# Patient Record
Sex: FEMALE | Race: WHITE | NOT HISPANIC OR LATINO | Employment: FULL TIME | URBAN - METROPOLITAN AREA
[De-identification: names, ages, dates, MRNs, and addresses within clinical notes are randomized per-mention and may not be internally consistent; named-entity substitution may affect disease eponyms.]

---

## 2021-05-26 ENCOUNTER — RECORDS - HEALTHEAST (OUTPATIENT)
Dept: ADMINISTRATIVE | Facility: CLINIC | Age: 51
End: 2021-05-26

## 2021-05-27 ENCOUNTER — RECORDS - HEALTHEAST (OUTPATIENT)
Dept: ADMINISTRATIVE | Facility: CLINIC | Age: 51
End: 2021-05-27

## 2021-05-31 ENCOUNTER — RECORDS - HEALTHEAST (OUTPATIENT)
Dept: ADMINISTRATIVE | Facility: CLINIC | Age: 51
End: 2021-05-31

## 2022-03-04 ENCOUNTER — OFFICE VISIT (OUTPATIENT)
Dept: FAMILY MEDICINE | Facility: CLINIC | Age: 52
End: 2022-03-04
Payer: COMMERCIAL

## 2022-03-04 VITALS
SYSTOLIC BLOOD PRESSURE: 175 MMHG | DIASTOLIC BLOOD PRESSURE: 108 MMHG | RESPIRATION RATE: 16 BRPM | OXYGEN SATURATION: 100 % | HEART RATE: 82 BPM | TEMPERATURE: 97.9 F

## 2022-03-04 DIAGNOSIS — R51.9 ACUTE NONINTRACTABLE HEADACHE, UNSPECIFIED HEADACHE TYPE: ICD-10-CM
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
## 2022-03-04 PROCEDURE — 99204 OFFICE O/P NEW MOD 45 MIN: CPT | Performed by: PHYSICIAN ASSISTANT

## 2022-03-04 NOTE — PROGRESS NOTES
Chief Complaint   Patient presents with     Head Injury     HAPPENED ON 3/1-- FROM A FALL, PERSISTENT HEAD PAIN.        ASSESSMENT/PLAN:  Maggy was seen today for head injury.    Diagnoses and all orders for this visit:    Fall, initial encounter  -     CT Head w/o Contrast; Future  -     CT Head w/o Contrast    Acute nonintractable headache, unspecified headache type  -     CT Head w/o Contrast; Future  -     CT Head w/o Contrast    Given patient's continued focal left-sided headache in a different pattern than her usual headache status post fall CT scan was obtained.  No intracranial abnormality noted.  Given this her symptoms are likely related to tension/whiplash-like injury.  Recommend heat, massage, range of motion, stretching and can use Tylenol and ibuprofen as needed    Tello Jennings PA-C      SUBJECTIVE:  Maggy is a 51 year old female who presents to urgent care with a headache.  She fell 3 days ago on ice and landed on her left knee, shoulder and left side of the face.  She has an abrasion and swelling of the left cheekbone.  The swelling has improved and does not have any significant tenderness there.  Sometime after the fall she developed a headache on the left top/temporal area that has been persistent.  No confusion or memory loss.  No nausea or vomiting.  No paresthesias, muscle weakness or gait abnormalities.  Some slight pain in the shoulder.  Has some chronic neck pain.  She often will get tension headaches but this feels different.  She is not on blood thinners    ROS: Pertinent ROS neg other than the symptoms noted above in the HPI.     OBJECTIVE:  BP (!) 175/108 (BP Location: Right arm, Patient Position: Sitting, Cuff Size: Adult Regular)   Pulse 82   Temp 97.9  F (36.6  C)   Resp 16   SpO2 100%    GENERAL: healthy, alert and no distress  EYES: Eyes grossly normal to inspection, PERRL and conjunctivae and sclerae normal  HENT: ear canals and TM's normal, nose and mouth without  ulcers or lesions, no rhinorrhea, patent oropharynx  NECK: no adenopathy, nontender, full range of motion  SKIN: no suspicious lesions or rashes  NEURO: Normal strength and tone, mentation intact and speech normal, cranial nerves II through XII intact, rapid alternating hand was normal, gait normal, finger-to-nose normal   MSK: No significant ecchymosis or breaks in skin of the scalp    DIAGNOSTICS  CT head w/o contrast:   No results found for any visits on 03/04/22.     No current outpatient medications on file.     No current facility-administered medications for this visit.      Patient Active Problem List   Diagnosis     Cervical Radiculopathy     Joint Pain, Localized In The Shoulder     Hyperlipidemia     Tendonitis      No past medical history on file.  Past Surgical History:   Procedure Laterality Date     [facility] APPENDECTOMY      Description: Appendectomy;  Recorded: 03/27/2008;     No family history on file.  Social History     Tobacco Use     Smoking status: Never Smoker     Smokeless tobacco: Never Used   Substance Use Topics     Alcohol use: Not on file              The plan of care was discussed with the patient. They understand and agree with the course of treatment prescribed. A printed summary was given including instructions and medications.  The use of Dragon/[employer] dictation services may have been used to construct the content in this note; any grammatical or spelling errors are non-intentional. Please contact the author of this note directly if you are in need of any clarification.

## 2022-03-27 ENCOUNTER — HEALTH MAINTENANCE LETTER (OUTPATIENT)
Age: 52
End: 2022-03-27

## 2022-09-25 ENCOUNTER — HEALTH MAINTENANCE LETTER (OUTPATIENT)
Age: 52
End: 2022-09-25

## 2023-05-13 ENCOUNTER — HEALTH MAINTENANCE LETTER (OUTPATIENT)
Age: 53
End: 2023-05-13

## 2024-01-09 ENCOUNTER — APPOINTMENT (OUTPATIENT)
Dept: CT IMAGING | Facility: HOSPITAL | Age: 54
End: 2024-01-09
Attending: EMERGENCY MEDICINE
Payer: COMMERCIAL

## 2024-01-09 ENCOUNTER — APPOINTMENT (OUTPATIENT)
Dept: RADIOLOGY | Facility: HOSPITAL | Age: 54
End: 2024-01-09
Attending: EMERGENCY MEDICINE
Payer: COMMERCIAL

## 2024-01-09 ENCOUNTER — DOCUMENTATION ONLY (OUTPATIENT)
Dept: OTHER | Facility: CLINIC | Age: 54
End: 2024-01-09

## 2024-01-09 ENCOUNTER — HOSPITAL ENCOUNTER (EMERGENCY)
Facility: HOSPITAL | Age: 54
Discharge: HOME | End: 2024-01-09
Attending: EMERGENCY MEDICINE | Admitting: EMERGENCY MEDICINE
Payer: COMMERCIAL

## 2024-01-09 ENCOUNTER — APPOINTMENT (OUTPATIENT)
Dept: ULTRASOUND IMAGING | Facility: HOSPITAL | Age: 54
End: 2024-01-09
Attending: EMERGENCY MEDICINE
Payer: COMMERCIAL

## 2024-01-09 ENCOUNTER — NURSE TRIAGE (OUTPATIENT)
Dept: NURSING | Facility: CLINIC | Age: 54
End: 2024-01-09
Payer: COMMERCIAL

## 2024-01-09 VITALS
TEMPERATURE: 99.5 F | HEART RATE: 104 BPM | RESPIRATION RATE: 20 BRPM | BODY MASS INDEX: 28.63 KG/M2 | HEIGHT: 70 IN | DIASTOLIC BLOOD PRESSURE: 88 MMHG | OXYGEN SATURATION: 93 % | WEIGHT: 200 LBS | SYSTOLIC BLOOD PRESSURE: 157 MMHG

## 2024-01-09 DIAGNOSIS — R10.13 EPIGASTRIC PAIN: ICD-10-CM

## 2024-01-09 DIAGNOSIS — R11.2 NAUSEA AND VOMITING, UNSPECIFIED VOMITING TYPE: ICD-10-CM

## 2024-01-09 LAB
ALBUMIN SERPL BCG-MCNC: 4.9 G/DL (ref 3.5–5.2)
ALBUMIN UR-MCNC: 70 MG/DL
ALP SERPL-CCNC: 81 U/L (ref 40–150)
ALT SERPL W P-5'-P-CCNC: 130 U/L (ref 0–50)
ANION GAP SERPL CALCULATED.3IONS-SCNC: 14 MMOL/L (ref 7–15)
APPEARANCE UR: ABNORMAL
AST SERPL W P-5'-P-CCNC: 78 U/L (ref 0–45)
BASOPHILS # BLD AUTO: 0 10E3/UL (ref 0–0.2)
BASOPHILS NFR BLD AUTO: 0 %
BILIRUB DIRECT SERPL-MCNC: 0.25 MG/DL (ref 0–0.3)
BILIRUB SERPL-MCNC: 1.4 MG/DL
BILIRUB UR QL STRIP: NEGATIVE
BUN SERPL-MCNC: 20.8 MG/DL (ref 6–20)
CALCIUM SERPL-MCNC: 11.4 MG/DL (ref 8.6–10)
CHLORIDE SERPL-SCNC: 100 MMOL/L (ref 98–107)
COLOR UR AUTO: YELLOW
CREAT SERPL-MCNC: 0.94 MG/DL (ref 0.51–0.95)
DEPRECATED HCO3 PLAS-SCNC: 27 MMOL/L (ref 22–29)
EGFRCR SERPLBLD CKD-EPI 2021: 72 ML/MIN/1.73M2
EOSINOPHIL # BLD AUTO: 0 10E3/UL (ref 0–0.7)
EOSINOPHIL NFR BLD AUTO: 0 %
ERYTHROCYTE [DISTWIDTH] IN BLOOD BY AUTOMATED COUNT: 12.6 % (ref 10–15)
GLUCOSE SERPL-MCNC: 141 MG/DL (ref 70–99)
GLUCOSE UR STRIP-MCNC: NEGATIVE MG/DL
HCT VFR BLD AUTO: 46.3 % (ref 35–47)
HGB BLD-MCNC: 16.2 G/DL (ref 11.7–15.7)
HGB UR QL STRIP: NEGATIVE
HOLD SPECIMEN: NORMAL
HYALINE CASTS: 2 /LPF
IMM GRANULOCYTES # BLD: 0 10E3/UL
IMM GRANULOCYTES NFR BLD: 0 %
KETONES UR STRIP-MCNC: 20 MG/DL
LEUKOCYTE ESTERASE UR QL STRIP: NEGATIVE
LIPASE SERPL-CCNC: 37 U/L (ref 13–60)
LYMPHOCYTES # BLD AUTO: 1.2 10E3/UL (ref 0.8–5.3)
LYMPHOCYTES NFR BLD AUTO: 11 %
MCH RBC QN AUTO: 34.4 PG (ref 26.5–33)
MCHC RBC AUTO-ENTMCNC: 35 G/DL (ref 31.5–36.5)
MCV RBC AUTO: 98 FL (ref 78–100)
MONOCYTES # BLD AUTO: 0.6 10E3/UL (ref 0–1.3)
MONOCYTES NFR BLD AUTO: 6 %
MUCOUS THREADS #/AREA URNS LPF: PRESENT /LPF
NEUTROPHILS # BLD AUTO: 8.9 10E3/UL (ref 1.6–8.3)
NEUTROPHILS NFR BLD AUTO: 83 %
NITRATE UR QL: NEGATIVE
NRBC # BLD AUTO: 0 10E3/UL
NRBC BLD AUTO-RTO: 0 /100
PH UR STRIP: 5.5 [PH] (ref 5–7)
PLATELET # BLD AUTO: 308 10E3/UL (ref 150–450)
POTASSIUM SERPL-SCNC: 4.7 MMOL/L (ref 3.4–5.3)
PROT SERPL-MCNC: 7.4 G/DL (ref 6.4–8.3)
RBC # BLD AUTO: 4.71 10E6/UL (ref 3.8–5.2)
RBC URINE: 0 /HPF
SODIUM SERPL-SCNC: 141 MMOL/L (ref 135–145)
SP GR UR STRIP: 1.02 (ref 1–1.03)
SQUAMOUS EPITHELIAL: 29 /HPF
TROPONIN T SERPL HS-MCNC: 14 NG/L
TROPONIN T SERPL HS-MCNC: 19 NG/L
UROBILINOGEN UR STRIP-MCNC: <2 MG/DL
WBC # BLD AUTO: 10.8 10E3/UL (ref 4–11)
WBC URINE: 0 /HPF

## 2024-01-09 PROCEDURE — 85025 COMPLETE CBC W/AUTO DIFF WBC: CPT | Performed by: STUDENT IN AN ORGANIZED HEALTH CARE EDUCATION/TRAINING PROGRAM

## 2024-01-09 PROCEDURE — 81001 URINALYSIS AUTO W/SCOPE: CPT | Performed by: STUDENT IN AN ORGANIZED HEALTH CARE EDUCATION/TRAINING PROGRAM

## 2024-01-09 PROCEDURE — 76705 ECHO EXAM OF ABDOMEN: CPT

## 2024-01-09 PROCEDURE — 93005 ELECTROCARDIOGRAM TRACING: CPT | Performed by: STUDENT IN AN ORGANIZED HEALTH CARE EDUCATION/TRAINING PROGRAM

## 2024-01-09 PROCEDURE — 82248 BILIRUBIN DIRECT: CPT | Performed by: STUDENT IN AN ORGANIZED HEALTH CARE EDUCATION/TRAINING PROGRAM

## 2024-01-09 PROCEDURE — 36415 COLL VENOUS BLD VENIPUNCTURE: CPT | Performed by: EMERGENCY MEDICINE

## 2024-01-09 PROCEDURE — 250N000011 HC RX IP 250 OP 636: Performed by: STUDENT IN AN ORGANIZED HEALTH CARE EDUCATION/TRAINING PROGRAM

## 2024-01-09 PROCEDURE — 36415 COLL VENOUS BLD VENIPUNCTURE: CPT | Performed by: STUDENT IN AN ORGANIZED HEALTH CARE EDUCATION/TRAINING PROGRAM

## 2024-01-09 PROCEDURE — 83690 ASSAY OF LIPASE: CPT | Performed by: STUDENT IN AN ORGANIZED HEALTH CARE EDUCATION/TRAINING PROGRAM

## 2024-01-09 PROCEDURE — 74177 CT ABD & PELVIS W/CONTRAST: CPT

## 2024-01-09 PROCEDURE — 84484 ASSAY OF TROPONIN QUANT: CPT | Performed by: EMERGENCY MEDICINE

## 2024-01-09 PROCEDURE — 96374 THER/PROPH/DIAG INJ IV PUSH: CPT | Mod: 59

## 2024-01-09 PROCEDURE — 84155 ASSAY OF PROTEIN SERUM: CPT | Performed by: STUDENT IN AN ORGANIZED HEALTH CARE EDUCATION/TRAINING PROGRAM

## 2024-01-09 PROCEDURE — 99285 EMERGENCY DEPT VISIT HI MDM: CPT | Mod: 25

## 2024-01-09 PROCEDURE — 250N000011 HC RX IP 250 OP 636: Performed by: EMERGENCY MEDICINE

## 2024-01-09 PROCEDURE — 71045 X-RAY EXAM CHEST 1 VIEW: CPT

## 2024-01-09 RX ORDER — FAMOTIDINE 20 MG/1
20 TABLET, FILM COATED ORAL 2 TIMES DAILY
Qty: 30 TABLET | Refills: 0 | Status: SHIPPED | OUTPATIENT
Start: 2024-01-09

## 2024-01-09 RX ORDER — ONDANSETRON 2 MG/ML
4 INJECTION INTRAMUSCULAR; INTRAVENOUS
Status: COMPLETED | OUTPATIENT
Start: 2024-01-09 | End: 2024-01-09

## 2024-01-09 RX ORDER — IOPAMIDOL 755 MG/ML
90 INJECTION, SOLUTION INTRAVASCULAR ONCE
Status: COMPLETED | OUTPATIENT
Start: 2024-01-09 | End: 2024-01-09

## 2024-01-09 RX ORDER — ONDANSETRON 4 MG/1
4 TABLET, ORALLY DISINTEGRATING ORAL EVERY 8 HOURS PRN
Qty: 12 TABLET | Refills: 0 | Status: SHIPPED | OUTPATIENT
Start: 2024-01-09 | End: 2024-01-13

## 2024-01-09 RX ADMIN — IOPAMIDOL 90 ML: 755 INJECTION, SOLUTION INTRAVENOUS at 12:35

## 2024-01-09 RX ADMIN — ONDANSETRON 4 MG: 2 INJECTION INTRAMUSCULAR; INTRAVENOUS at 09:33

## 2024-01-09 NOTE — TELEPHONE ENCOUNTER
Nurse Triage SBAR    Is this a 2nd Level Triage? NO    Situation: Vomiting with nausea and heartburn      Assessment: Woke up this morning at 1:30 with severe heartburn and then woke up at 3:30 with nausea and vomiting, states she has no chest pain, did vomit 6 times and it was pink but she states it was not blood/ No abdominal pain. Also feels constipated, last BM was three days ago.    Protocol Recommended Disposition:   See in Office Today, See More Appropriate Protocol Care advise given and will go to  in [place].    Radha Hook RN on 1/9/2024 at 7:26 AM        Reason for Disposition   Vomiting occurs   MILD to MODERATE vomiting (e.g., 1-5 times/day) and lasts > 48 hours (2 days)    Additional Information   Negative: Shock suspected (e.g., cold/pale/clammy skin, too weak to stand, low BP, rapid pulse)   Negative: Difficult to awaken or acting confused (e.g., disoriented, slurred speech)   Negative: Sounds like a life-threatening emergency to the triager   Negative: Vomiting occurs only while coughing   Negative: Pregnant < 20 Weeks and nausea/vomiting began in early pregnancy (i.e., 4-8 weeks pregnant)   Negative: Chest pain   Negative: Headache is main symptom   Negative: Vomiting red blood or black (coffee ground) material   Negative: Vomiting and hernia is more painful or swollen than usual   Negative: Recent head injury (within 3 days)   Negative: Recent abdominal injury (within 7 days)   Negative: Insulin-dependent diabetes and glucose > 240 mg/dL (13 mmol/L)   Negative: Severe pain in one eye   Negative: Shock suspected (e.g., cold/pale/clammy skin, too weak to stand, low BP, rapid pulse)   Negative: Sounds like a life-threatening emergency to the triager   Negative: Nausea or vomiting and pregnancy < 20 weeks   Negative: Menstrual Period - Missed or Late (i.e., pregnancy suspected)   Negative: Heat exhaustion suspected (i.e., dehydration from heat exposure)   Negative: Anxiety or stress suspected  (i.e., nausea with anxiety attacks or stressful situations)   Negative: Traumatic Brain Injury (TBI) suspected    Protocols used: Nausea-A-OH, Vomiting-A-OH

## 2024-01-09 NOTE — ED TRIAGE NOTES
Patient presents here with nausea, vomiting and abdominal pain that began at about 1 am. The pain that she has is described as burning. She locates the pain to the upper midline abdomen with periods of burning up her esophagus. Eating or drinking anything stimulates severe vomiting.

## 2024-01-11 ENCOUNTER — OFFICE VISIT (OUTPATIENT)
Dept: FAMILY MEDICINE | Facility: CLINIC | Age: 54
End: 2024-01-11
Payer: COMMERCIAL

## 2024-01-11 VITALS
HEIGHT: 68 IN | HEART RATE: 90 BPM | WEIGHT: 196.6 LBS | BODY MASS INDEX: 29.8 KG/M2 | TEMPERATURE: 99.5 F | RESPIRATION RATE: 12 BRPM | OXYGEN SATURATION: 97 % | SYSTOLIC BLOOD PRESSURE: 153 MMHG | DIASTOLIC BLOOD PRESSURE: 104 MMHG

## 2024-01-11 DIAGNOSIS — K76.0 FATTY LIVER: ICD-10-CM

## 2024-01-11 DIAGNOSIS — R10.13 EPIGASTRIC PAIN: ICD-10-CM

## 2024-01-11 DIAGNOSIS — R79.89 ELEVATED LIVER FUNCTION TESTS: ICD-10-CM

## 2024-01-11 DIAGNOSIS — E83.52 HYPERCALCEMIA: ICD-10-CM

## 2024-01-11 PROCEDURE — 80053 COMPREHEN METABOLIC PANEL: CPT | Performed by: FAMILY MEDICINE

## 2024-01-11 PROCEDURE — 80074 ACUTE HEPATITIS PANEL: CPT | Performed by: FAMILY MEDICINE

## 2024-01-11 PROCEDURE — 36415 COLL VENOUS BLD VENIPUNCTURE: CPT | Performed by: FAMILY MEDICINE

## 2024-01-11 PROCEDURE — 99214 OFFICE O/P EST MOD 30 MIN: CPT | Performed by: FAMILY MEDICINE

## 2024-01-11 NOTE — PATIENT INSTRUCTIONS
Recheck calcium and liver enzymes (CMP)    Check viral Hepatitis screen    Follow up with the Hepatologist and gastroenterology    No alcohol!    TAKE PEPCID 20 MG TWICE DAILY

## 2024-01-11 NOTE — LETTER
January 14, 2024      Maggy M Jaclyn  [address]   [place] [address]        Dear   We are writing to inform you of your test results.    Holger Winter:  Your calcium is now normal.  The screening for viral hepatitis is NEGATIVE.  Two liver enzymes have slightly improved, though are still elevated.  The total bilirubin went up slightly.  The remaining labs are normal.  Follow up with the GI and Liver specialists as scheduled.    Resulted Orders   Comprehensive metabolic panel (BMP + Alb, Alk Phos, ALT, AST, Total. Bili, TP)   Result Value Ref Range    Sodium 136 135 - 145 mmol/L      Comment:      Reference intervals for this test were updated on 09/26/2023 to more accurately reflect our healthy population. There may be differences in the flagging of prior results with similar values performed with this method. Interpretation of those prior results can be made in the context of the updated reference intervals.     Potassium 4.3 3.4 - 5.3 mmol/L    Carbon Dioxide (CO2) 23 22 - 29 mmol/L    Anion Gap 16 (H) 7 - 15 mmol/L    Urea Nitrogen 17.6 6.0 - 20.0 mg/dL    Creatinine 0.96 (H) 0.51 - 0.95 mg/dL    GFR Estimate 70 >60 mL/min/1.73m2    Calcium 9.9 8.6 - 10.0 mg/dL    Chloride 97 (L) 98 - 107 mmol/L    Glucose 100 (H) 70 - 99 mg/dL    Alkaline Phosphatase 68 40 - 150 U/L      Comment:      Reference intervals for this test were updated on 11/14/2023 to more accurately reflect our healthy population. There may be differences in the flagging of prior results with similar values performed with this method. Interpretation of those prior results can be made in the context of the updated reference intervals.    AST 69 (H) 0 - 45 U/L      Comment:      Reference intervals for this test were updated on 6/12/2023 to more accurately reflect our healthy population. There may be differences in the flagging of prior results with similar values performed with this method. Interpretation of those prior  results can be made in the context of the updated reference intervals.     (H) 0 - 50 U/L      Comment:      Reference intervals for this test were updated on 6/12/2023 to more accurately reflect our healthy population. There may be differences in the flagging of prior results with similar values performed with this method. Interpretation of those prior results can be made in the context of the updated reference intervals.      Protein Total 7.0 6.4 - 8.3 g/dL    Albumin 4.4 3.5 - 5.2 g/dL    Bilirubin Total 1.8 (H) <=1.2 mg/dL   Acute hepatitis panel   Result Value Ref Range    Hepatitis A Antibody IgM Nonreactive Nonreactive      Comment:      Nonreactive results indicate either inadequate or delayed anti-HAV IgM response after known exposure to HAV or absence of acute or recent hepatitis A.    Hepatitis B Core Antibody IgM Nonreactive Nonreactive      Comment:      A nonreactive result suggests lack of recent exposure to the virus in the preceding 6 months.    Hepatitis C Antibody Nonreactive Nonreactive      Comment:      A nonreactive screening test result does not exclude the possibility of exposure to or infection with HCV. Nonreactive screening test results in individuals with prior exposure to HCV may be due to antibody levels below the limit of detection of this assay or lack of reactivity to the HCV antigens used in this assay. Patients with recent HCV infections (<3 months from time of exposure) may have false-negative HCV antibody results due to the time needed for seroconversion (average of 8 to 9 weeks).    Hepatitis B Surface Antigen Nonreactive Nonreactive       If you have any questions or concerns, please call the clinic at the number listed above.       Sincerely,      Sam Hunter MD

## 2024-01-11 NOTE — PROGRESS NOTES
"      Yusuf Winter is a 53 year old, presenting for the following health issues:  Hospital F/U (Follow up hospital for extreme nausea and heartburn.  Some constipation.  Feeling good now.  Has been taking pepcid and one dose of zofran. )        1/11/2024     2:58 PM   Additional Questions   Roomed by PINO Gonzalez CMA([facility])       HPI     Two days ago in the ER had pressure and fullness in the upper abdomen (had heart burn following a hamburger before going to bed), awoke at 1:20 am with extreme heartburn.  Took antacid without help.  Explosive vomiting at about 3:30am of water. (Pink color but had had gas-ex).  Called nurse line at 4-5am and then went to [facility] ER.  Drinks about 3 cocktails a week and nothing that evening.  No chest pain with the walking she does at her job.  Takes Advil 2 at a time a couple times a week.    Since leaving the hospital.  Feeling better.  Has been taking pepcid rxed at the hospital.          Hospital Follow-up Visit:    Hospital/Nursing Home/IP Rehab Facility: [facility]  Date of Admission: 01/09/2024  Date of Discharge: 01/09/2024  Reason(s) for Admission: FINAL IMPRESSION:  1. Nausea and vomiting, unspecified vomiting type    2. Epigastric pain         Was your hospitalization related to COVID-19? No   Problems taking medications regularly:  None  Medication changes since discharge: None  Problems adhering to non-medication therapy:  None    Summary of hospitalization:  [facility] discharge summary reviewed  Diagnostic Tests/Treatments reviewed.  Follow up needed: repeat liver enzymes and calcium  Other Healthcare Providers Involved in Patient s Care:         None  Update since discharge: improved.         Plan of care communicated with patient           Review of Systems        Objective    BP (!) 153/104   Pulse 90   Temp 99.5  F (37.5  C) (Oral)   Resp 12   Ht 1.734 m (5' 8.25\")   Wt 89.2 kg (196 lb 9.6 oz)   SpO2 97%   " BMI 29.67 kg/m    Body mass index is 29.67 kg/m .    Wt Readings from Last 4 Encounters:   01/11/24 89.2 kg (196 lb 9.6 oz)   01/09/24 90.7 kg (200 lb)      BP Readings from Last 6 Encounters:   01/11/24 (!) 153/104   01/09/24 (!) 157/88   03/04/22 (!) 175/108      Physical Exam   GENERAL: healthy, alert and no distress  RESP: lungs clear to auscultation - no rales, rhonchi or wheezes  CV: regular rate and rhythm, normal S1 S2, no S3 or S4, no murmur, click or rub, no peripheral edema and peripheral pulses strong  ABDOMEN: soft, nontender, no hepatosplenomegaly, no masses and bowel sounds normal  MS: no gross musculoskeletal defects noted, no edema      EXAM: US ABDOMEN LIMITED  LOCATION: [facility]  DATE: 1/9/2024     INDICATION: Upper abdominal pain  COMPARISON: None.  TECHNIQUE: Limited abdominal ultrasound.     FINDINGS:     GALLBLADDER: Normal. No gallstones, wall thickening, or pericholecystic fluid. Negative sonographic Chawla's sign.     BILE DUCTS: No biliary dilatation. The common duct measures 5 mm.     LIVER: Mildly enlarged with a diffusely coarsened echotexture. Normal surface contour. Antegrade flow in the normal caliber main portal vein. No focal mass.     RIGHT KIDNEY: No hydronephrosis.     PANCREAS: The visualized portions are normal.     No ascites.                                                                      IMPRESSION:  1.  Mildly enlarged fatty liver.  2.  Normal gallbladder and bile ducts. No cholelithiasis and no evidence of acute cholecystitis.         EXAM: CT ABDOMEN PELVIS W CONTRAST  LOCATION: [facility]  DATE: 1/9/2024     INDICATION: Mid abdominal pain  COMPARISON: Ultrasound abdomen dated same day  TECHNIQUE: CT scan of the abdomen and pelvis was performed following injection of IV contrast. Multiplanar reformats were obtained. Dose reduction techniques were used.  CONTRAST: IsoVue 370 90mL     FINDINGS:      LOWER CHEST:  Scattered airway centric groundglass within both lung bases, possibly aspiration. Coronary calcifications.     HEPATOBILIARY: Hepatic steatosis. No biliary dilation. Normal gallbladder.     PANCREAS: Normal.     SPLEEN: Normal.     ADRENAL GLANDS: Normal.     KIDNEYS/BLADDER: Normal kidneys. Contrast is seen within the bilateral renal collecting systems. No hydronephrosis. Underdistended bladder, limiting evaluation.      BOWEL: Small hiatal hernia. No obstruction. Appendix is not visualized. No secondary findings of acute appendicitis in the right lower quadrant. Colonic diverticulosis. No pneumoperitoneum or free fluid.      LYMPH NODES: No pathologically enlarged lymph nodes.     VASCULATURE: Nonaneurysmal aorta with minimal aortoiliac calcifications.      PELVIC ORGANS: 2.1 cm right adnexal cyst.     MUSCULOSKELETAL: No acute osseous abnormalities.                                                                      IMPRESSION:  1.  No acute abnormalities in the abdomen or pelvis.  2.  Marked hepatic steatosis, which can cause cirrhosis. Consider nonemergent/outpatient hepatology consultation.  3.  Bibasilar airway centric groundglass, possibly sequela of aspiration.  4.  Chronic and ancillary findings as described.      EXAM: XR CHEST 1 VIEW  LOCATION: [facility]  DATE: 1/9/2024     INDICATION: Chest and epigastric pain  COMPARISON: None.                                                                      IMPRESSION: Negative chest.  The lungs are clear and there are no pleural effusions. Normal heart size.    CBC RESULTS:   Recent Labs   Lab Test 01/09/24  0925   WBC 10.8   RBC 4.71   HGB 16.2*   HCT 46.3   MCV 98   MCH 34.4*   MCHC 35.0   RDW 12.6         Last Comprehensive Metabolic Panel:  Sodium   Date Value Ref Range Status   01/09/2024 141 135 - 145 mmol/L Final     Comment:     Reference intervals for this test were updated on 09/26/2023 to more accurately reflect our  healthy population. There may be differences in the flagging of prior results with similar values performed with this method. Interpretation of those prior results can be made in the context of the updated reference intervals.      Potassium   Date Value Ref Range Status   01/09/2024 4.7 3.4 - 5.3 mmol/L Final     Chloride   Date Value Ref Range Status   01/09/2024 100 98 - 107 mmol/L Final     Carbon Dioxide (CO2)   Date Value Ref Range Status   01/09/2024 27 22 - 29 mmol/L Final     Anion Gap   Date Value Ref Range Status   01/09/2024 14 7 - 15 mmol/L Final     Glucose   Date Value Ref Range Status   01/09/2024 141 (H) 70 - 99 mg/dL Final     Urea Nitrogen   Date Value Ref Range Status   01/09/2024 20.8 (H) 6.0 - 20.0 mg/dL Final     Creatinine   Date Value Ref Range Status   01/09/2024 0.94 0.51 - 0.95 mg/dL Final     GFR Estimate   Date Value Ref Range Status   01/09/2024 72 >60 mL/min/1.73m2 Final     Calcium   Date Value Ref Range Status   01/09/2024 11.4 (H) 8.6 - 10.0 mg/dL Final     Bilirubin Total   Date Value Ref Range Status   01/09/2024 1.4 (H) <=1.2 mg/dL Final     Alkaline Phosphatase   Date Value Ref Range Status   01/09/2024 81 40 - 150 U/L Final     Comment:     Reference intervals for this test were updated on 11/14/2023 to more accurately reflect our healthy population. There may be differences in the flagging of prior results with similar values performed with this method. Interpretation of those prior results can be made in the context of the updated reference intervals.     ALT   Date Value Ref Range Status   01/09/2024 130 (H) 0 - 50 U/L Final     Comment:     Reference intervals for this test were updated on 6/12/2023 to more accurately reflect our healthy population. There may be differences in the flagging of prior results with similar values performed with this method. Interpretation of those prior results can be made in the context of the updated reference intervals.       AST   Date  Value Ref Range Status   01/09/2024 78 (H) 0 - 45 U/L Final     Comment:     Reference intervals for this test were updated on 6/12/2023 to more accurately reflect our healthy population. There may be differences in the flagging of prior results with similar values performed with this method. Interpretation of those prior results can be made in the context of the updated reference intervals.       Lipase   Date Value Ref Range Status   01/09/2024 37 13 - 60 U/L Final             Encounter Diagnoses   Name Primary?    Epigastric pain, (fullness), resolved     Hypercalcemia     Elevated liver function tests     Fatty liver       PLAN:        Recheck calcium and liver enzymes (CMP)    Check viral Hepatitis screen    Follow up with the Hepatologist and gastroenterology    No alcohol!    TAKE PEPCID 20 MG TWICE DAILY     34 min spent in review of ER visit and plans going forward.

## 2024-01-11 NOTE — COMMUNITY RESOURCES LIST (ENGLISH)
01/11/2024   [facility]  N/A  For questions about this resource list or additional care needs, please contact your primary care clinic or care manager.  Phone: [phone]   Email: N/A   Address: [facility] [place] [address]   Hours: N/A        Transportation       Free or low-cost transportation  1  [facility] Distance: 1.31 miles      In-Person   [address] [place] [address]  Language: English  Hours: Mon - Wed 9:30 AM - 12:00 PM , Mon - Wed 1:00 PM - 2:00 PM Appt. Only  Fees: Free   Phone: (587) 343-1881 Email: info@[facility].org Website: http://www.[facility].org/     2  [facility] Distance: 1.45 miles      In-Person   [address] [place] [address]  Language: English  Hours: Mon - Fri 8:00 AM - 3:00 PM  Fees: Free   Phone: (826) [phone] Ext.14 Email: [email or web address] Website: [email or web address]     Transportation to medical appointments  3  [facility] Medical Transportation - Non-Emergency Medical Transportation Distance: 7.01 miles      In-Person   [address] [place] [address]  Language: English  Hours: Mon - Fri 8:00 AM - 4:00 PM Appt. Only  Fees: Self Pay   Phone: (192) 738-4997 Website: [email or web address]-Services/[email or web address]-medical-services/Non-emergency-transportation/     4  [facility] Transportation Programs - Non-Emergency Medical Transportation Distance: 7.5 miles      In-Person   [address] [place] [address]  Language: English, Cambodian, Portuguese  Hours: Mon - Fri 7:00 AM - 6:00 PM  Fees: Insurance   Phone: [phone]) [phone] Ext.4425 Email: [email or web address] Website: [email or web address]          Important Numbers & Websites       Emergency Services   911  City Services   311  Poison Control   (856) 792-4605  Suicide Prevention Lifeline   (199) 548-3047 (TALK)  Child Abuse Hotline   (592) 400-1622 (4-A-Child)  Sexual  Assault Hotline   (807) 936-8462 (HOPE)  National Runaway Safeline   (839) 988-3686 (RUNAWAY)  All-Options Talkline   (420) 970-3022  Substance Abuse Referral   (607) 249-5477 (HELP)

## 2024-01-11 NOTE — CONFIDENTIAL NOTE
DIAGNOSIS:   Epigastric pain    Appt Date: 03.08.2024    NOTES STATUS DETAILS   OFFICE NOTE from referring provider Internal 01.09.2024  Len Jeffers DO    OFFICE NOTES from other specialists     DISCHARGE SUMMARY from hospital Internal 01.09.2024 Len Jeffers DO    MEDICATION LIST Internal    LIVER BIOSPY (IF APPLICABLE)      PATHOLOGY REPORTS      IMAGING     ENDOSCOPY (IF AVAILABLE)     COLONOSCOPY (IF AVAILABLE)     ULTRASOUND LIVER Internal 01.09.2024 US ABDOMEN LIMITED    CT OF ABDOMEN Internal 01.09.2024 CT ABDOMEN PELVIS W CONTRAST    MRI OF LIVER     FIBROSCAN, US ELASTOGRAPHY, FIBROSIS SCAN, MR ELASTOGRAPHY     LABS     HEPATIC PANEL (LIVER PANEL)     BASIC METABOLIC PANEL     COMPLETE METABOLIC PANEL Internal 01.09.2024   COMPLETE BLOOD COUNT (CBC) Internal 01.09.2024   INTERNATIONAL NORMALIZED RATIO (INR)     HEPATITIS C ANTIBODY     HEPATITIS C VIRAL LOAD/PCR     HEPATITIS C GENOTYPE     HEPATITIS B SURFACE ANTIGEN     HEPATITIS B SURFACE ANTIBODY     HEPATITIS B DNA QUANT LEVEL     HEPATITIS B CORE ANTIBODY

## 2024-01-12 LAB
ALBUMIN SERPL BCG-MCNC: 4.4 G/DL (ref 3.5–5.2)
ALP SERPL-CCNC: 68 U/L (ref 40–150)
ALT SERPL W P-5'-P-CCNC: 108 U/L (ref 0–50)
ANION GAP SERPL CALCULATED.3IONS-SCNC: 16 MMOL/L (ref 7–15)
AST SERPL W P-5'-P-CCNC: 69 U/L (ref 0–45)
ATRIAL RATE - MUSE: 109 BPM
BILIRUB SERPL-MCNC: 1.8 MG/DL
BUN SERPL-MCNC: 17.6 MG/DL (ref 6–20)
CALCIUM SERPL-MCNC: 9.9 MG/DL (ref 8.6–10)
CHLORIDE SERPL-SCNC: 97 MMOL/L (ref 98–107)
CREAT SERPL-MCNC: 0.96 MG/DL (ref 0.51–0.95)
DEPRECATED HCO3 PLAS-SCNC: 23 MMOL/L (ref 22–29)
DIASTOLIC BLOOD PRESSURE - MUSE: NORMAL MMHG
EGFRCR SERPLBLD CKD-EPI 2021: 70 ML/MIN/1.73M2
GLUCOSE SERPL-MCNC: 100 MG/DL (ref 70–99)
HAV IGM SERPL QL IA: NONREACTIVE
HBV CORE IGM SERPL QL IA: NONREACTIVE
HBV SURFACE AG SERPL QL IA: NONREACTIVE
HCV AB SERPL QL IA: NONREACTIVE
INTERPRETATION ECG - MUSE: NORMAL
P AXIS - MUSE: 42 DEGREES
POTASSIUM SERPL-SCNC: 4.3 MMOL/L (ref 3.4–5.3)
PR INTERVAL - MUSE: 158 MS
PROT SERPL-MCNC: 7 G/DL (ref 6.4–8.3)
QRS DURATION - MUSE: 72 MS
QT - MUSE: 346 MS
QTC - MUSE: 465 MS
R AXIS - MUSE: 37 DEGREES
SODIUM SERPL-SCNC: 136 MMOL/L (ref 135–145)
SYSTOLIC BLOOD PRESSURE - MUSE: NORMAL MMHG
T AXIS - MUSE: 49 DEGREES
VENTRICULAR RATE- MUSE: 109 BPM

## 2024-02-08 NOTE — TELEPHONE ENCOUNTER
REFERRAL INFORMATION:  Referring Provider:  Len Jeffers DO   Referring Clinic:    [facility]     Reason for Visit/Diagnosis: Epigastric pain      FUTURE VISIT INFORMATION:  Appointment Date: 2/15/2024   Appointment Time: 10:45AM      NOTES STATUS DETAILS   OFFICE NOTE from Referring Provider Internal Len Jeffers DO    OFFICE NOTE from Other Specialist Internal In [facility] DISCHARGE SUMMARY/  ED VISITS Internal 1/9/2024 ED- Abdominal Pain   MEDICATION LIST Internal         PERTINENT LABS Internal    IMAGING (CT, MRI, EGD, MRCP, Small Bowel Follow Through/SBT, MR/CT Enterography) Internal CT Abdomen Pelvis 1/9/2024   US abdomen Limited 1/9/2024

## 2024-02-15 ENCOUNTER — PRE VISIT (OUTPATIENT)
Dept: GASTROENTEROLOGY | Facility: CLINIC | Age: 54
End: 2024-02-15

## 2024-03-01 DIAGNOSIS — K76.0 FATTY LIVER: Primary | ICD-10-CM

## 2024-03-08 ENCOUNTER — PRE VISIT (OUTPATIENT)
Dept: GASTROENTEROLOGY | Facility: CLINIC | Age: 54
End: 2024-03-08

## 2024-07-20 ENCOUNTER — HEALTH MAINTENANCE LETTER (OUTPATIENT)
Age: 54
End: 2024-07-20

## 2025-03-08 ENCOUNTER — OFFICE VISIT (OUTPATIENT)
Dept: URGENT CARE | Facility: URGENT CARE | Age: 55
End: 2025-03-08
Payer: COMMERCIAL

## 2025-03-08 VITALS
TEMPERATURE: 98.6 F | OXYGEN SATURATION: 98 % | WEIGHT: 200 LBS | SYSTOLIC BLOOD PRESSURE: 182 MMHG | RESPIRATION RATE: 24 BRPM | BODY MASS INDEX: 30.19 KG/M2 | HEART RATE: 85 BPM | DIASTOLIC BLOOD PRESSURE: 121 MMHG

## 2025-03-08 DIAGNOSIS — S05.02XA ABRASION OF LEFT CORNEA, INITIAL ENCOUNTER: Primary | ICD-10-CM

## 2025-03-08 PROCEDURE — 3080F DIAST BP >= 90 MM HG: CPT

## 2025-03-08 PROCEDURE — 3077F SYST BP >= 140 MM HG: CPT

## 2025-03-08 PROCEDURE — 99213 OFFICE O/P EST LOW 20 MIN: CPT

## 2025-03-08 RX ORDER — GENTAMICIN SULFATE 3 MG/ML
1-2 SOLUTION/ DROPS OPHTHALMIC EVERY 4 HOURS
Qty: 15 ML | Refills: 0 | Status: SHIPPED | OUTPATIENT
Start: 2025-03-08 | End: 2025-03-13

## 2025-03-08 RX ORDER — KETOROLAC TROMETHAMINE 5 MG/ML
1 SOLUTION OPHTHALMIC EVERY 6 HOURS PRN
Qty: 5 ML | Refills: 0 | Status: SHIPPED | OUTPATIENT
Start: 2025-03-08

## 2025-03-08 ASSESSMENT — ENCOUNTER SYMPTOMS: EYE PAIN: 1

## 2025-03-08 ASSESSMENT — VISUAL ACUITY: OU: 1

## 2025-03-08 NOTE — PROGRESS NOTES
Assessment & Plan     Abrasion of left cornea, initial encounter  - gentamicin (GARAMYCIN) 0.3 % ophthalmic solution  Dispense: 15 mL; Refill: 0  - ketorolac (ACULAR) 0.5 % ophthalmic solution  Dispense: 5 mL; Refill: 0    Recommended follow-up with eye clinic if symptoms not improving.  Discussed signs and symptoms that would prompt immediate return to medical attention.             No follow-ups on file.    [facility]  PROVIDER  [facility]    Subjective     Maggy is a 54 year old female who presents to clinic today for the following health issues:  Chief Complaint   Patient presents with    Eye Problem     Bilateral eye redness. HX of chronic dry eye. New symptoms started over the last couple of days. Painful and getting worse.          3/8/2025     4:39 PM   Additional Questions   Roomed by Elizabeth ISABEL   Accompanied by self     Eye Problem         Maggy is a 54-year-old with chronic dry eyes on lubrication treatment who presents with a left painful reddened eye.  She states that both of her eyes are frequently bloodshot, but the left eye has significant pain associated with it on the lateral side.  She has a sense that she probably did rub and/or scratch this area but cannot recall the specific incident.  No concern for foreign body.  No change in her vision.  No purulent drainage.  No surrounding lid swelling, skin changes, or neurological symptoms.      Review of Systems   Eyes:  Positive for pain.     Constitutional, HEENT, cardiovascular, pulmonary, gi and gu systems are negative, except as otherwise noted.      Objective    BP (!) 182/121   Pulse 85   Temp 98.6  F (37  C) (Oral)   Resp 24   Wt 90.7 kg (200 lb)   SpO2 98%   BMI 30.19 kg/m    Physical Exam  Constitutional:       General: She is not in acute distress.  HENT:      Head: Normocephalic.   Eyes:      General: Lids are normal. Lids are everted, no foreign bodies appreciated. Vision grossly intact.         Right eye: No  foreign body, discharge or hordeolum.         Left eye: No foreign body, discharge or hordeolum.      Conjunctiva/sclera:      Right eye: Right conjunctiva is not injected. No chemosis, exudate or hemorrhage.     Left eye: Left conjunctiva is injected.        Comments: Increased fluorescein uptake the lateral left.  No foreign body noted.    Tetracaine drops instilled into left eye prior to exam.   Neurological:      Mental Status: She is alert.

## 2025-05-17 ENCOUNTER — HEALTH MAINTENANCE LETTER (OUTPATIENT)
Age: 55
End: 2025-05-17

## 2025-08-09 ENCOUNTER — HEALTH MAINTENANCE LETTER (OUTPATIENT)
Age: 55
End: 2025-08-09